# Patient Record
(demographics unavailable — no encounter records)

---

## 2024-11-18 NOTE — DISCUSSION/SUMMARY
[FreeTextEntry1] : 22 month old F with symptoms likely 2/2 viral URI causing croup. PE and vitals are wnl.   Recommend supportive care including antipyretics, fluids, and humidifier/warm bath, then nasal saline followed by nasal suction. Education provided for signs of respiratory distress and dehydration. Return if symptoms worsen or persist. Start Prednisolone BID x3 days

## 2024-11-18 NOTE — HISTORY OF PRESENT ILLNESS
[de-identified] : CONGESTION, BARKING COUGH, VOMITING  [FreeTextEntry6] : 22 month old F presenting for 1 day of symptoms. Started overnight with barking cough, post-tussive emesis with mucous in it, rhinorrhea/congestion, and decreased appetite/drinking. Still making WD but smaller amounts/stronger scent than normal. Mom denies trouble breathing, lethargy. No  but was around older kids.

## 2024-11-18 NOTE — HISTORY OF PRESENT ILLNESS
[de-identified] : CONGESTION, BARKING COUGH, VOMITING  [FreeTextEntry6] : 22 month old F presenting for 1 day of symptoms. Started overnight with barking cough, post-tussive emesis with mucous in it, rhinorrhea/congestion, and decreased appetite/drinking. Still making WD but smaller amounts/stronger scent than normal. Mom denies trouble breathing, lethargy. No  but was around older kids.

## 2024-11-18 NOTE — PHYSICAL EXAM
[Erythematous Oropharynx] : erythematous oropharynx [NL] : warm, clear [Enlarged Tonsils] : tonsils not enlarged [Vesicles] : no vesicles [Exudate] : no exudate [Wheezing] : no wheezing [Crackles] : no crackles [Transmitted Upper Airway Sounds] : no transmitted upper airway sounds [Tachypnea] : no tachypnea [Rhonchi] : no rhonchi [FreeTextEntry1] : Crying

## 2025-01-22 NOTE — DEVELOPMENTAL MILESTONES
[Normal Development] : Normal Development [None] : none [Plays alongside other children] : plays alongside other children [Scoops well with spoon] : scoops well with spoon [Uses 50 words] : uses 50 words [Combine 2 words into phrase or] : combines 2 words into phrase or sentences [Follows 2-step command] : follows 2-step command [Kicks ball] : kicks ball  [Jumps off ground with 2 feet] : jumps off ground with 2 feet [Runs with coordination] : runs with coordination [Climbs up a ladder at a] : climbs up a ladder at a playground [Stacks objects] : stacks objects

## 2025-01-25 NOTE — PHYSICAL EXAM
[Alert] : alert [No Acute Distress] : no acute distress [Normocephalic] : normocephalic [Anterior [place] Closed] : anterior fontanelle closed [Red Reflex Bilateral] : red reflex bilateral [PERRL] : PERRL [Normally Placed Ears] : normally placed ears [Auricles Well Formed] : auricles well formed [Clear Tympanic membranes with present light reflex and bony landmarks] : clear tympanic membranes with present light reflex and bony landmarks [No Discharge] : no discharge [Nares Patent] : nares patent [Palate Intact] : palate intact [Uvula Midline] : uvula midline [Tooth Eruption] : tooth eruption  [Supple, full passive range of motion] : supple, full passive range of motion [No Palpable Masses] : no palpable masses [Symmetric Chest Rise] : symmetric chest rise [Clear to Auscultation Bilaterally] : clear to auscultation bilaterally [Regular Rate and Rhythm] : regular rate and rhythm [S1, S2 present] : S1, S2 present [No Murmurs] : no murmurs [Soft] : soft [NonTender] : non tender [Non Distended] : non distended [Normoactive Bowel Sounds] : normoactive bowel sounds [No Hepatomegaly] : no hepatomegaly [No Splenomegaly] : no splenomegaly [Clint 1] : Clint 1 [No Clitoromegaly] : no clitoromegaly [No Abnormal Lymph Nodes Palpated] : no abnormal lymph nodes palpated [No Clavicular Crepitus] : no clavicular crepitus [Symmetric Buttocks Creases] : symmetric buttocks creases [No Spinal Dimple] : no spinal dimple [NoTuft of Hair] : no tuft of hair [Cranial Nerves Grossly Intact] : cranial nerves grossly intact [FreeTextEntry5] : glasses [de-identified] : Fading hemangioma Left thigh, foot

## 2025-01-25 NOTE — DISCUSSION/SUMMARY
[FreeTextEntry1] :  3 yo female here for [facility].   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 - Appropriate growth & Development for age - Continue offering balanced diet including fruits/vegetables and drinking mostly water - Brush teeth twice a day with soft toothbrush. Recommend visit to dentist. - Put toddler to sleep in own bed. Help toddler to maintain consistent daily routines and sleep schedule. - Read aloud to Toddler. - Limit screen time to max 1-2 hours per day.  - vaccines given today: Hep A - Hb, Lead & Amblyopia screen wnl - Return for 30 month [facility]

## 2025-01-25 NOTE — HISTORY OF PRESENT ILLNESS
[Mother] : mother [Cow's milk (Ounces per day ___)] : consumes [unfilled] oz of Cow's milk per day [Normal] : Normal [Brushing teeth] : Brushing teeth [Toothpaste] : Primary Fluoride Source: Toothpaste [Car seat in back seat] : Car seat in back seat [Smoke Detectors] : Smoke detectors [Carbon Monoxide Detectors] : Carbon monoxide detectors [Up to date] : Up to date [de-identified] : + yogurt/cheese..good appeite.  [FreeTextEntry8] : Miralax helps [FreeTextEntry9] : HOME [FreeTextEntry1] :  Retinal Specialist & peds Ophthalmologist  - Considering Surgery for retinal stability.   Saw ENT - Passed hearing test.   Hemangioma - continuing to fade

## 2025-01-25 NOTE — DISCUSSION/SUMMARY
[FreeTextEntry1] :  3 yo female here for [facility].   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 Recommend visit to dentist. - Put toddler to sleep in own bed. Help toddler to maintain consistent daily routines and sleep schedule. - Read aloud to Toddler. - Limit screen time to max 1-2 hours per day.  - vaccines given today: Hep A - Hb, Lead & Amblyopia screen wnl - Return for 30 month [facility]

## 2025-01-25 NOTE — PHYSICAL EXAM
[Alert] : alert [No Acute Distress] : no acute distress [Normocephalic] : normocephalic [Anterior [place] Closed] : anterior fontanelle closed [Red Reflex Bilateral] : red reflex bilateral [PERRL] : PERRL [Normally Placed Ears] : normally placed ears [Auricles Well Formed] : auricles well formed [Clear Tympanic membranes with present light reflex and bony landmarks] : clear tympanic membranes with present light reflex and bony landmarks [No Discharge] : no discharge [Nares Patent] : nares patent [Palate Intact] : palate intact [Uvula Midline] : uvula midline [Tooth Eruption] : tooth eruption  [Supple, full passive range of motion] : supple, full passive range of motion [No Palpable Masses] : no palpable masses [Symmetric Chest Rise] : symmetric chest rise [Clear to Auscultation Bilaterally] : clear to auscultation bilaterally [Regular Rate and Rhythm] : regular rate and rhythm [S1, S2 present] : S1, S2 present [No Murmurs] : no murmurs [Soft] : soft [NonTender] : non tender [Non Distended] : non distended [Normoactive Bowel Sounds] : normoactive bowel sounds [No Hepatomegaly] : no hepatomegaly [No Splenomegaly] : no splenomegaly [Clint 1] : Clint 1 [No Clitoromegaly] : no clitoromegaly [No Abnormal Lymph Nodes Palpated] : no abnormal lymph nodes palpated [No Clavicular Crepitus] : no clavicular crepitus [Symmetric Buttocks Creases] : symmetric buttocks creases [No Spinal Dimple] : no spinal dimple [NoTuft of Hair] : no tuft of hair [Cranial Nerves Grossly Intact] : cranial nerves grossly intact [FreeTextEntry5] : glasses [de-identified] : Fading hemangioma Left thigh, foot

## 2025-07-23 NOTE — HISTORY OF PRESENT ILLNESS
[de-identified] : 3 y/o F here for yearly follow up with a history of Stickler Syndrome No evidence of cleft palate and hearing loss. Evaluated by genetics  Under the care of an ophthalmologist  History of left side and left foot hemangioma. Treated with hemangeol and has significantly faded and now off medication Passed  Hearing screen  Gaining weight and growing No parental concerns with hearing and speech No history of ear infections No ear pain or drainage. No throat infections or recent fevers No history of snoring. No nasl regurg of foood or sounds

## 2025-07-23 NOTE — ASSESSMENT
[FreeTextEntry1] : This child had passed today's audiogram. As the exam was normal as well I would manage expectantly and return to clinic sooner prn symptoms (change in hearing, tinnitus, vertigo, pain).  We discussed the importance of hearing preservation by avoiding head trauma (bike helmets) and avoiding noise induced hearing loss.  *Soundfield testing only tests the better hearing ear and a future repeat audio can be ear specific if done at an older age/when patient is cooperative.  GIVEN Stickler syndrome will repeat audio at FU to try to get ear specific info and RTC sooner if fluid>3mo or >3+ infections in a short perior or signs of VPI/nasal regurg of sounds or liquids.

## 2025-07-23 NOTE — CONSULT LETTER
[Dear  ___] : Dear  [unfilled], [Consult Letter:] : I had the pleasure of evaluating your patient, [unfilled]. [Please see my note below.] : Please see my note below. [Consult Closing:] : Thank you very much for allowing me to participate in the care of this patient.  If you have any questions, please do not hesitate to contact me. [Sincerely,] : Sincerely, [FreeTextEntry2] : Abebe Horan [address] [place] [address] [FreeTextEntry3] : Rissa Donovan MD   Pediatric Otolaryngology/ Head & Neck Surgery [facility] , Otolaryngology; [facility]  [facility] [place] [address] Tel [phone] Fax [phone]

## 2025-07-23 NOTE — PHYSICAL EXAM
[1+] : 1+ [Clear to Auscultation] : lungs were clear to auscultation bilaterally [Normal Gait and Station] : normal gait and station [Normal muscle strength, symmetry and tone of facial, head and neck musculature] : normal muscle strength, symmetry and tone of facial, head and neck musculature [Normal] : no cervical lymphadenopathy [Exposed Vessel] : left anterior vessel not exposed [Wheezing] : no wheezing [Increased Work of Breathing] : no increased work of breathing with use of accessory muscles and retractions [de-identified] : thick glasses [de-identified] : no palpated notch, normal uvula

## 2025-07-23 NOTE — DATA REVIEWED
[FreeTextEntry1] : An audiogram was performed today to evaluate eustachian tube status and hearing status and the results were reviewed and reveal: Tymps: AD type A tympanogram, AS type A tympanogram Soundfield/Thresholds: WNL S

## 2025-07-28 NOTE — DEVELOPMENTAL MILESTONES
[Normal Development] : Normal Development [None] : none [Urinates in a potty or toilet] : urinates in a potty or toilet [Pokes food with fork] : pokes food with fork [Explains the reason for things,] : explains the reason for things, such as needing a sweater when it's cold [Names at least one color] : names at least one color [Walks up steps, using one] : walks up steps, using one foot, then the other [Runs well without falling] : runs well without falling [Grasps crayon with thumb] : grasps crayon with thumb and fingers instead of fist [Catches a large ball] : catches a large ball

## 2025-07-29 NOTE — DISCUSSION/SUMMARY
[Family Routines] : family routines [Language Promotion and Communication] : language promotion and communication [Social Development] : social development [ Considerations] :  considerations [Safety] : safety [Mother] : mother [Father] : father [] : The components of the vaccine(s) to be administered today are listed in the plan of care. The disease(s) for which the vaccine(s) are intended to prevent and the risks have been discussed with the caretaker.  The risks are also included in the appropriate vaccination information statements which have been provided to the patient's caregiver.  The caregiver has given consent to vaccinate. [FreeTextEntry1] : 2.6 yo female here for [facility].   Stickler Syndrome - C/w Ophthalmology and Retinal specialist follow up  [facility] - Appropriate growth & Development for age (SWYC Reviewed) - Continue offering balanced diet including fruits/vegetables and drinking mostly water - Brush teeth twice a day with soft toothbrush. Recommend visit to dentist. - Put toddler to sleep in own bed. Help toddler to maintain consistent daily routines and sleep schedule. - Read aloud to Toddler. - Limit screen time to max 1-2 hours per day.  - vaccines given today: UTD - Return  for 3 yr [facility]

## 2025-07-29 NOTE — DISCUSSION/SUMMARY
[Family Routines] : family routines [Language Promotion and Communication] : language promotion and communication [Social Development] : social development [ Considerations] :  considerations [Safety] : safety [Mother] : mother [Father] : father [] : The components of the vaccine(s) to be administered today are listed in the plan of care. The disease(s) for which the vaccine(s) are intended to prevent and the risks have been discussed with the caretaker.  The risks are also included in the appropriate vaccination information statements which have been provided to the patient's caregiver.  The caregiver has given consent to vaccinate. [FreeTextEntry1] : 2.6 yo female here for [facility].   Stickler Syndrome - C/w Ophthalmology and Retinal specialist follow up  [facility] - Appropriate growth & Development for age (SWYC Reviewed) - Continue offering balanced diet including fruits/vegetables and drinking mostly water - Brush teeth twice a day with soft toothbrush. Recommend visit to dentist. - Put toddler to sleep in own bed. Help toddler to maintain consistent daily routines and sleep schedule. - Read aloud to Toddler. - Limit screen time to max 1-2 hours per day.  - vaccines given today: UTD - Return  for 3 yr [facility]

## 2025-07-29 NOTE — HISTORY OF PRESENT ILLNESS
[Parents] : parents [Normal] : Normal [In bed] : In bed [Car seat in back seat] : Car seat in back seat [Carbon Monoxide Detectors] : Carbon monoxide detectors [Smoke Detectors] : Smoke detectors [de-identified] : eating well, varied diet - will try new foods. ...loves breakfast.  [FreeTextEntry8] : Starting to use the potty.  [FreeTextEntry9] : HOME - planning ot start 2 hr 3yo program in fall. ... riding bikes, play with dog. swimming [FreeTextEntry1] :  Ophthalmologist - Peds & Retinal Specialist - follows with each bi-annually - Consideration of surgery once 6-6 yo

## 2025-07-29 NOTE — PHYSICAL EXAM

## 2025-07-29 NOTE — PHYSICAL EXAM

## 2025-07-29 NOTE — HISTORY OF PRESENT ILLNESS
[Parents] : parents [Normal] : Normal [In bed] : In bed [Car seat in back seat] : Car seat in back seat [Carbon Monoxide Detectors] : Carbon monoxide detectors [Smoke Detectors] : Smoke detectors [de-identified] : eating well, varied diet - will try new foods. ...loves breakfast.  [FreeTextEntry8] : Starting to use the potty.  [FreeTextEntry9] : HOME - planning ot start 2 hr 3yo program in fall. ... riding bikes, play with dog. swimming [FreeTextEntry1] :  Ophthalmologist - Peds & Retinal Specialist - follows with each bi-annually - Consideration of surgery once 6-6 yo